# Patient Record
Sex: MALE | Race: BLACK OR AFRICAN AMERICAN
[De-identification: names, ages, dates, MRNs, and addresses within clinical notes are randomized per-mention and may not be internally consistent; named-entity substitution may affect disease eponyms.]

---

## 2017-06-12 ENCOUNTER — HOSPITAL ENCOUNTER (EMERGENCY)
Dept: HOSPITAL 23 - SED | Age: 47
Discharge: HOME | End: 2017-06-12
Payer: COMMERCIAL

## 2017-06-12 DIAGNOSIS — I25.2: ICD-10-CM

## 2017-06-12 DIAGNOSIS — E86.0: Primary | ICD-10-CM

## 2017-06-12 DIAGNOSIS — I10: ICD-10-CM

## 2017-06-12 LAB
BARBITURATES UR QL SCN: 0.3 MG/DL (ref 0.2–2)
BARBITURATES UR QL SCN: 4.4 G/DL (ref 3.5–5)
BARBITURATES: (no result)
BASOPHIL#: 0.1 X10E3 (ref 0–0.3)
BASOPHIL%: 0.8 % (ref 0–2.5)
BENZODIAZ UR QL SCN: 22 U/L (ref 10–40)
BENZODIAZ UR QL SCN: 24 U/L (ref 10–42)
BENZODIAZEPINES: (no result)
BLOOD UREA NITROGEN: 28 MG/DL (ref 9–23)
BUN/CREATININE RATIO: 16.47
BZE UR QL SCN: 82 U/L (ref 32–92)
CALCIUM SERUM: 9.5 MG/DL (ref 8.4–10.2)
CK MB SERPL-RTO: 14.2 % (ref 11–15.5)
CK MB SERPL-RTO: 33.3 G/DL (ref 30–36)
COCAINE: (no result)
CREATININE SERUM: 1.7 MG/DL (ref 0.6–1.4)
DIFF IND: NO
DX ICD CODE: (no result)
DX ICD CODE: (no result)
EOSINOPHIL#: 0.1 X10E3 (ref 0–0.7)
EOSINOPHIL%: 0.7 % (ref 0–7)
GENTAMICIN PEAK SERPL-MCNC: NO MG/L
GLOM FILT RATE ESTIMATED: 54.1 ML/MIN (ref 60–?)
GLUCOSE FASTING: 96 MG/DL (ref 70–110)
HEMATOCRIT: 42.3 % (ref 38–50)
HEMOGLOBIN: 14.1 GM/DL (ref 13–16)
KETONES UR QL: 101 MMOL/L (ref 100–111)
KETONES UR QL: 29 MMOL/L (ref 22–31)
LYMPHOCYTE#: 1.5 X10E3 (ref 1–3.5)
LYMPHOCYTE%: 16.9 % (ref 17–45)
MEAN CELL VOLUME: 83.1 FL (ref 83–96)
MEAN CORPUSCULAR HEMOGLOBIN: 27.7 PG (ref 28–34)
MEAN PLATELET VOLUME: 10.2 FL (ref 6.5–11.5)
METHADONE UR QL SCN: 2.3 NG/ML (ref 0–7.9)
MICRO INDICATED?: YES
MONOCYTE#: 0.7 X10E3 (ref 0–1)
MONOCYTE%: 7.3 % (ref 3–12)
NEUTROPHIL#: 6.6 X10E3 (ref 1.5–7.1)
NEUTROPHIL%: 74.3 % (ref 40–75)
OPIATES: (no result)
PLATELET COUNT: 262 X10E3 (ref 140–420)
POC - TROPONIN: <0.05 NG/ML (ref ?–0.05)
POTASSIUM: 3 MMOL/L (ref 3.5–5.1)
PROTEIN TOTAL SERUM: 8.1 G/DL (ref 6–8.3)
RED BLOOD COUNT: 5.09 X10E (ref 3.9–5.6)
SODIUM: 139 MMOL/L (ref 135–145)
TRICYCLIC ANTIDEPRESSANTS: (no result)
U METHADONE: (no result)
URINE APPEARANCE: CLEAR
URINE BACTERIA: (no result)
URINE BILIRUBIN: (no result)
URINE BLOOD: (no result)
URINE COLOR: YELLOW
URINE GLUCOSE: (no result) MG/DL
URINE GRANULAR CAST: (no result) /[HPF]
URINE HYALINE CAST: (no result) /[HPF]
URINE KETONE: (no result)
URINE LEUKOCYTE ESTERASE: (no result)
URINE NITRATE: (no result)
URINE PH: 6.5 (ref 5–8)
URINE PROTEIN: (no result)
URINE RBC: (no result) /[HPF] (ref 0–2)
URINE SOURCE: (no result)
URINE SPECIFIC GRAVITY: <=1.005 (ref 1–1.03)
URINE UROBILINOGEN: 0.2 MG/DL
URINE WBC: (no result) /[HPF] (ref 0–5)
WHITE BLOOD COUNT: 8.9 X10E3 (ref 4–10.5)

## 2018-05-19 ENCOUNTER — HOSPITAL ENCOUNTER (EMERGENCY)
Facility: HOSPITAL | Age: 48
Discharge: HOME | End: 2018-05-19
Attending: EMERGENCY MEDICINE | Admitting: EMERGENCY MEDICINE

## 2018-05-19 ENCOUNTER — APPOINTMENT (OUTPATIENT)
Dept: CT IMAGING | Facility: HOSPITAL | Age: 48
End: 2018-05-19

## 2018-05-19 VITALS
TEMPERATURE: 97.5 F | RESPIRATION RATE: 16 BRPM | SYSTOLIC BLOOD PRESSURE: 127 MMHG | HEIGHT: 66 IN | DIASTOLIC BLOOD PRESSURE: 81 MMHG | BODY MASS INDEX: 27 KG/M2 | HEART RATE: 74 BPM | OXYGEN SATURATION: 99 % | WEIGHT: 168 LBS

## 2018-05-19 DIAGNOSIS — R10.13 EPIGASTRIC ABDOMINAL PAIN: Primary | ICD-10-CM

## 2018-05-19 DIAGNOSIS — K52.9 GASTROENTERITIS: ICD-10-CM

## 2018-05-19 DIAGNOSIS — K20.90 ESOPHAGITIS: ICD-10-CM

## 2018-05-19 LAB
ALBUMIN SERPL-MCNC: 4 G/DL (ref 3.5–5.2)
ALBUMIN/GLOB SERPL: 1.1 G/DL
ALP SERPL-CCNC: 78 U/L (ref 39–117)
ALT SERPL W P-5'-P-CCNC: 15 U/L (ref 1–41)
ANION GAP SERPL CALCULATED.3IONS-SCNC: 14.4 MMOL/L
AST SERPL-CCNC: 17 U/L (ref 1–40)
BASOPHILS # BLD AUTO: 0.02 10*3/MM3 (ref 0–0.2)
BASOPHILS NFR BLD AUTO: 0.2 % (ref 0–1.5)
BILIRUB SERPL-MCNC: 0.2 MG/DL (ref 0.1–1.2)
BUN BLD-MCNC: 18 MG/DL (ref 6–20)
BUN/CREAT SERPL: 15.7 (ref 7–25)
CALCIUM SPEC-SCNC: 8.9 MG/DL (ref 8.6–10.5)
CHLORIDE SERPL-SCNC: 101 MMOL/L (ref 98–107)
CO2 SERPL-SCNC: 25.6 MMOL/L (ref 22–29)
CREAT BLD-MCNC: 1.15 MG/DL (ref 0.76–1.27)
DEPRECATED RDW RBC AUTO: 45.9 FL (ref 37–54)
EOSINOPHIL # BLD AUTO: 0.08 10*3/MM3 (ref 0–0.7)
EOSINOPHIL NFR BLD AUTO: 1 % (ref 0.3–6.2)
ERYTHROCYTE [DISTWIDTH] IN BLOOD BY AUTOMATED COUNT: 14.8 % (ref 11.5–14.5)
GFR SERPL CREATININE-BSD FRML MDRD: 68 ML/MIN/1.73
GLOBULIN UR ELPH-MCNC: 3.5 GM/DL
GLUCOSE BLD-MCNC: 121 MG/DL (ref 65–99)
HCT VFR BLD AUTO: 44.3 % (ref 40.4–52.2)
HGB BLD-MCNC: 14.5 G/DL (ref 13.7–17.6)
IMM GRANULOCYTES # BLD: 0.03 10*3/MM3 (ref 0–0.03)
IMM GRANULOCYTES NFR BLD: 0.4 % (ref 0–0.5)
LIPASE SERPL-CCNC: 126 U/L (ref 13–60)
LYMPHOCYTES # BLD AUTO: 1.66 10*3/MM3 (ref 0.9–4.8)
LYMPHOCYTES NFR BLD AUTO: 20.5 % (ref 19.6–45.3)
MCH RBC QN AUTO: 27.9 PG (ref 27–32.7)
MCHC RBC AUTO-ENTMCNC: 32.7 G/DL (ref 32.6–36.4)
MCV RBC AUTO: 85.4 FL (ref 79.8–96.2)
MONOCYTES # BLD AUTO: 0.63 10*3/MM3 (ref 0.2–1.2)
MONOCYTES NFR BLD AUTO: 7.8 % (ref 5–12)
NEUTROPHILS # BLD AUTO: 5.69 10*3/MM3 (ref 1.9–8.1)
NEUTROPHILS NFR BLD AUTO: 70.5 % (ref 42.7–76)
PLATELET # BLD AUTO: 262 10*3/MM3 (ref 140–500)
PMV BLD AUTO: 12.1 FL (ref 6–12)
POTASSIUM BLD-SCNC: 3.2 MMOL/L (ref 3.5–5.2)
PROT SERPL-MCNC: 7.5 G/DL (ref 6–8.5)
RBC # BLD AUTO: 5.19 10*6/MM3 (ref 4.6–6)
SODIUM BLD-SCNC: 141 MMOL/L (ref 136–145)
TROPONIN T SERPL-MCNC: <0.01 NG/ML (ref 0–0.03)
WBC NRBC COR # BLD: 8.08 10*3/MM3 (ref 4.5–10.7)

## 2018-05-19 PROCEDURE — 93005 ELECTROCARDIOGRAM TRACING: CPT | Performed by: PHYSICIAN ASSISTANT

## 2018-05-19 PROCEDURE — 25010000002 ONDANSETRON PER 1 MG: Performed by: PHYSICIAN ASSISTANT

## 2018-05-19 PROCEDURE — 96361 HYDRATE IV INFUSION ADD-ON: CPT

## 2018-05-19 PROCEDURE — 84484 ASSAY OF TROPONIN QUANT: CPT | Performed by: PHYSICIAN ASSISTANT

## 2018-05-19 PROCEDURE — 93010 ELECTROCARDIOGRAM REPORT: CPT | Performed by: INTERNAL MEDICINE

## 2018-05-19 PROCEDURE — 80053 COMPREHEN METABOLIC PANEL: CPT | Performed by: PHYSICIAN ASSISTANT

## 2018-05-19 PROCEDURE — 85025 COMPLETE CBC W/AUTO DIFF WBC: CPT | Performed by: PHYSICIAN ASSISTANT

## 2018-05-19 PROCEDURE — 99285 EMERGENCY DEPT VISIT HI MDM: CPT

## 2018-05-19 PROCEDURE — 96374 THER/PROPH/DIAG INJ IV PUSH: CPT

## 2018-05-19 PROCEDURE — 74177 CT ABD & PELVIS W/CONTRAST: CPT

## 2018-05-19 PROCEDURE — 25010000002 IOPAMIDOL 61 % SOLUTION: Performed by: EMERGENCY MEDICINE

## 2018-05-19 PROCEDURE — 83690 ASSAY OF LIPASE: CPT | Performed by: PHYSICIAN ASSISTANT

## 2018-05-19 RX ORDER — ONDANSETRON 4 MG/1
4 TABLET, FILM COATED ORAL EVERY 8 HOURS PRN
Qty: 15 TABLET | Refills: 0 | Status: SHIPPED | OUTPATIENT
Start: 2018-05-19

## 2018-05-19 RX ORDER — PANTOPRAZOLE SODIUM 40 MG/1
40 TABLET, DELAYED RELEASE ORAL DAILY
Qty: 30 TABLET | Refills: 0 | Status: SHIPPED | OUTPATIENT
Start: 2018-05-19

## 2018-05-19 RX ORDER — ONDANSETRON 2 MG/ML
4 INJECTION INTRAMUSCULAR; INTRAVENOUS ONCE
Status: COMPLETED | OUTPATIENT
Start: 2018-05-19 | End: 2018-05-19

## 2018-05-19 RX ORDER — SODIUM CHLORIDE 0.9 % (FLUSH) 0.9 %
10 SYRINGE (ML) INJECTION AS NEEDED
Status: DISCONTINUED | OUTPATIENT
Start: 2018-05-19 | End: 2018-05-19 | Stop reason: HOSPADM

## 2018-05-19 RX ORDER — ONDANSETRON 4 MG/1
4 TABLET, ORALLY DISINTEGRATING ORAL ONCE
Status: COMPLETED | OUTPATIENT
Start: 2018-05-19 | End: 2018-05-19

## 2018-05-19 RX ORDER — ACETAMINOPHEN 500 MG
1000 TABLET ORAL ONCE
Status: COMPLETED | OUTPATIENT
Start: 2018-05-19 | End: 2018-05-19

## 2018-05-19 RX ORDER — ALUMINA, MAGNESIA, AND SIMETHICONE 2400; 2400; 240 MG/30ML; MG/30ML; MG/30ML
15 SUSPENSION ORAL ONCE
Status: COMPLETED | OUTPATIENT
Start: 2018-05-19 | End: 2018-05-19

## 2018-05-19 RX ADMIN — ONDANSETRON 4 MG: 4 TABLET, ORALLY DISINTEGRATING ORAL at 13:59

## 2018-05-19 RX ADMIN — ALUMINUM HYDROXIDE, MAGNESIUM HYDROXIDE, AND DIMETHICONE 15 ML: 400; 400; 40 SUSPENSION ORAL at 12:22

## 2018-05-19 RX ADMIN — IOPAMIDOL 85 ML: 612 INJECTION, SOLUTION INTRAVENOUS at 12:06

## 2018-05-19 RX ADMIN — SODIUM CHLORIDE 1000 ML: 9 INJECTION, SOLUTION INTRAVENOUS at 10:54

## 2018-05-19 RX ADMIN — LIDOCAINE HYDROCHLORIDE 15 ML: 20 SOLUTION ORAL; TOPICAL at 12:22

## 2018-05-19 RX ADMIN — ONDANSETRON 4 MG: 2 SOLUTION INTRAMUSCULAR; INTRAVENOUS at 11:08

## 2018-05-19 RX ADMIN — ACETAMINOPHEN 1000 MG: 500 TABLET, FILM COATED ORAL at 14:34

## 2018-05-19 NOTE — ED NOTES
Pt feeling less nauseated and asking for water. Water given at this time.      Michelle Bingham RN  05/19/18 4936

## 2018-05-19 NOTE — ED NOTES
"Pt reports abd \"burning\" does not want crackers. Wants juice. Juice given.      Michelle Bingham RN  05/19/18 2775    "

## 2018-05-19 NOTE — ED NOTES
Will reEVAL pt's readiness for DC post zofran      ED provider aware     Michelle Bingham, RN  05/19/18 1400

## 2018-05-19 NOTE — ED NOTES
Pt reports now has a headache. Is walking around in [place]. Requesting tylenol. Less nausea and tolerating fluids      Michelle Bingham RN  05/19/18 7486       Michelle Bingham RN  05/19/18 2299

## 2018-05-19 NOTE — ED PROVIDER NOTES
Discussed pt's case with Harpal SPANN.  Pt presents to the ER c/o epigastric abdominal pain that began at approximately 0800 this morning. Pt also c/o mild dyspnea and N/V.     Exam:  Unremarkable exam  Pt is non-toxic appearing   Alert/ oriented x3  No apparent distress     MDM  Plan to perform CT abdomen/ pelvis for further evaluation.     MD ATTESTATION NOTE    The LILIAM and I have discussed this patient's history, physical exam, and treatment plan.  I have reviewed the documentation and personally had a face to face interaction with the patient. I affirm the documentation and agree with the treatment and plan.  The attached note describes my personal findings.    Documentation assistance provided by dayan Lockett for Dr. Watson.  Information recorded by the scribe was done at my direction and has been verified and validated by me.         Kurt Lockett  05/19/18 1221       Manuel Watson MD  05/19/18 6172

## 2018-05-19 NOTE — ED PROVIDER NOTES
" EMERGENCY DEPARTMENT ENCOUNTER    CHIEF COMPLAINT  Chief Complaint: epigastric pain with N/V  History given by: patient  History limited by: poor historian  Room Number: 12/12  PMD: No Known Provider      HPI:  Pt is a 47 y.o. male who presents complaining of epigastric pain with N/V that began today around 0800 this AM. He states that it began with him \"feeling hot\" and was also experiencing SOA and dizziness. Pt states he often gets heartburn. He denies diarrhea or being around others that are sick. Per EMS, pt refused an IV, blood sugar level check, and ASA PTA. Pt states his symptoms have improved. Pt says he had open heart surgery in 2015.    Duration:  2.5 hours  Onset: gradual  Timing: constant  Location: epigastric  Radiation: none  Quality: \"pain\", N/V  Intensity/Severity: moderate  Progression: unchanged  Associated Symptoms: none  Aggravating Factors: none  Alleviating Factors: none  Previous Episodes: Pt does not report previous episode.  Treatment before arrival: Pt reports no treatment PTA.    PAST MEDICAL HISTORY  Active Ambulatory Problems     Diagnosis Date Noted   • No Active Ambulatory Problems     Resolved Ambulatory Problems     Diagnosis Date Noted   • No Resolved Ambulatory Problems     Past Medical History:   Diagnosis Date   • Hypertension        PAST SURGICAL HISTORY  Past Surgical History:   Procedure Laterality Date   • CARDIAC SURGERY      stated heart surgery but does not know what       FAMILY HISTORY  History reviewed. No pertinent family history.    SOCIAL HISTORY  Social History     Social History   • Marital status: Single     Spouse name: N/A   • Number of children: N/A   • Years of education: N/A     Occupational History   • Not on file.     Social History Main Topics   • Smoking status: Former Smoker   • Smokeless tobacco: Not on file   • Alcohol use No   • Drug use: No   • Sexual activity: Defer     Other Topics Concern   • Not on file     Social History Narrative   • No " narrative on file       ALLERGIES  Patient has no known allergies.    REVIEW OF SYSTEMS  Review of Systems   Constitutional: Positive for fever (subjective). Negative for activity change and appetite change.   HENT: Negative for congestion and sore throat.    Respiratory: Positive for shortness of breath. Negative for cough.    Cardiovascular: Negative for chest pain and leg swelling.   Gastrointestinal: Positive for abdominal pain (epigastric), nausea and vomiting. Negative for diarrhea.   Genitourinary: Negative for decreased urine volume and dysuria.   Musculoskeletal: Negative for neck pain.   Skin: Negative for rash and wound.   Neurological: Positive for dizziness. Negative for weakness, numbness and headaches.   All other systems reviewed and are negative.      PHYSICAL EXAM  ED Triage Vitals [05/19/18 1008]   Temp Heart Rate Resp BP SpO2   97.5 °F (36.4 °C) 76 -- 150/90 99 %      Temp src Heart Rate Source Patient Position BP Location FiO2 (%)   Tympanic Monitor -- -- --       Physical Exam   Constitutional: He is oriented to person, place, and time and well-developed, well-nourished, and in no distress.   HENT:   Head: Normocephalic and atraumatic.   Eyes: EOM are normal. Pupils are equal, round, and reactive to light.   Neck: Normal range of motion. Neck supple.   Cardiovascular: Normal rate, regular rhythm and normal heart sounds.    Pulmonary/Chest: Effort normal and breath sounds normal. No respiratory distress.   Abdominal: Soft. There is tenderness (mild) in the epigastric area. There is no rebound, no guarding and no CVA tenderness.   Musculoskeletal: Normal range of motion. He exhibits no edema.   Neurological: He is alert and oriented to person, place, and time. He has normal sensation and normal strength.   Skin: Skin is warm and dry.   Psychiatric: Mood and affect normal.   Nursing note and vitals reviewed.      LAB RESULTS  Lab Results (last 24 hours)     Procedure Component Value Units  Date/Time    CBC & Differential [[id number]] Collected:  05/19/18 1101    Specimen:  Blood Updated:  05/19/18 1127    Narrative:       The following orders were created for panel order CBC & Differential.  Procedure                               Abnormality         Status                     ---------                               -----------         ------                     CBC Auto Differential[[id number]]        Abnormal            Final result                 Please view results for these tests on the individual orders.    Comprehensive Metabolic Panel [[id number]]  (Abnormal) Collected:  05/19/18 1101    Specimen:  Blood Updated:  05/19/18 1150     Glucose 121 (H) mg/dL      BUN 18 mg/dL      Creatinine 1.15 mg/dL      Sodium 141 mmol/L      Potassium 3.2 (L) mmol/L      Chloride 101 mmol/L      CO2 25.6 mmol/L      Calcium 8.9 mg/dL      Total Protein 7.5 g/dL      Albumin 4.00 g/dL      ALT (SGPT) 15 U/L      AST (SGOT) 17 U/L      Alkaline Phosphatase 78 U/L      Total Bilirubin 0.2 mg/dL      eGFR Non African Amer 68 mL/min/1.73      Globulin 3.5 gm/dL      A/G Ratio 1.1 g/dL      BUN/Creatinine Ratio 15.7     Anion Gap 14.4 mmol/L     Lipase [[id number]]  (Abnormal) Collected:  05/19/18 1101    Specimen:  Blood Updated:  05/19/18 1150     Lipase 126 (H) U/L     CBC Auto Differential [[id number]]  (Abnormal) Collected:  05/19/18 1101    Specimen:  Blood Updated:  05/19/18 1127     WBC 8.08 10*3/mm3      RBC 5.19 10*6/mm3      Hemoglobin 14.5 g/dL      Hematocrit 44.3 %      MCV 85.4 fL      MCH 27.9 pg      MCHC 32.7 g/dL      RDW 14.8 (H) %      RDW-SD 45.9 fl      MPV 12.1 (H) fL      Platelets 262 10*3/mm3      Neutrophil % 70.5 %      Lymphocyte % 20.5 %      Monocyte % 7.8 %      Eosinophil % 1.0 %      Basophil % 0.2 %      Immature Grans % 0.4 %      Neutrophils, Absolute 5.69 10*3/mm3      Lymphocytes, Absolute 1.66 10*3/mm3      Monocytes, Absolute 0.63 10*3/mm3      Eosinophils, Absolute 0.08  10*3/mm3      Basophils, Absolute 0.02 10*3/mm3      Immature Grans, Absolute 0.03 10*3/mm3     Troponin [[id number]]  (Normal) Collected:  18 1101    Specimen:  Blood Updated:  18 1150     Troponin T <0.010 ng/mL     Narrative:       Troponin T Reference Ranges:  Less than 0.03 ng/mL:    Negative for AMI  0.03 to 0.09 ng/mL:      Indeterminant for AMI  Greater than 0.09 ng/mL: Positive for AMI          I ordered the above labs and reviewed the results    RADIOLOGY  CT Abdomen Pelvis With Contrast      The CT scan was performed as an emergency procedure through the abdomen  and pelvis with intravenous contrast and demonstrates the followin. The lung bases are clear. The liver, spleen, pancreas, and both  adrenal glands appear normal. There are several small bilateral renal  cysts and no evidence of renal obstruction. The gallbladder shows no  gallstones or wall thickening.  2. There is no aortic aneurysm or retroperitoneal lymphadenopathy. The  large and small bowel loops are normal in caliber and show no  inflammatory change. The appendix appears normal. No abnormality is seen  in the pelvis.        I ordered the above noted radiological studies. Interpreted by radiologist. Reviewed by me in PACS.       PROCEDURES  Procedures  EKG           EKG time: 1055  Rhythm/Rate: NSR, 73  P waves and CA: normal  QRS, axis: normal   ST and T waves: normal     Interpreted Contemporaneously by me, independently viewed  No prior for comparison.      PROGRESS AND CONSULTS  1029  Ordered xylocaine and maalox for pain, EKG, and troponin after bedside evaluation.    1325  Rechecked pt who is resting comfortably. His pain and nausea is improved. Notified pt of all lab, imaging, and EKG results. Discussed plan to d/c home. Pt understands and agrees with the plan, all questions answered.    MEDICAL DECISION MAKING  Results were reviewed/discussed with the patient and they were also made aware of online access. Pt also  made aware that some labs, such as cultures, will not be resulted during ER visit and follow up with PMD is necessary.     MDM  Number of Diagnoses or Management Options  Epigastric abdominal pain:   Gastroenteritis:      Amount and/or Complexity of Data Reviewed  Clinical lab tests: ordered and reviewed (Lipase=126)  Tests in the radiology section of CPT®: ordered and reviewed (CT abd/pelvis is negative.)  Tests in the medicine section of CPT®: ordered and reviewed (See procedure note.)  Independent visualization of images, tracings, or specimens: yes    Patient Progress  Patient progress: stable         DIAGNOSIS  Final diagnoses:   Epigastric abdominal pain   Gastroenteritis   Esophagitis       DISPOSITION  DISCHARGE    Patient discharged in stable condition.    Reviewed implications of results, diagnosis, meds, responsibility to follow up, warning signs and symptoms of possible worsening, potential complications and reasons to return to ER.    Patient/Family voiced understanding of above instructions.    Discussed plan for discharge, as there is no emergent indication for admission. Patient referred to primary care provider for BP management due to today's BP. Pt/family is agreeable and understands need for follow up and repeat testing.  Pt is aware that discharge does not mean that nothing is wrong but it indicates no emergency is present that requires admission and they must continue care with follow-up as given below or physician of their choice.     FOLLOW-UP  Judd Salamanca MD  [address] [address]  [phone]    Schedule an appointment as soon as possible for a visit       [place] [address]  [phone]  Schedule an appointment as soon as possible for a visit   As needed         Medication List      New Prescriptions    ondansetron 4 MG tablet  Commonly known as:  ZOFRAN  Take 1 tablet by mouth Every 8 (Eight) Hours As Needed for  Nausea or   Vomiting.     pantoprazole 40 MG EC tablet  Commonly known as:  PROTONIX  Take 1 tablet by mouth Daily.          Latest Documented Vital Signs:  As of 1:40 PM  BP- 132/95 HR- 72 Temp- 97.5 °F (36.4 °C) (Tympanic) O2 sat- 97%    --  Documentation assistance provided by dayan Deng for Anam Rowan PA-C.  Information recorded by the scribe was done at my direction and has been verified and validated by me.     Kristin Deng  05/19/18 1340       JANICE Franco  05/19/18 5902

## 2018-05-24 ENCOUNTER — OFFICE VISIT (OUTPATIENT)
Dept: GASTROENTEROLOGY | Facility: CLINIC | Age: 48
End: 2018-05-24

## 2018-05-24 VITALS
WEIGHT: 174 LBS | DIASTOLIC BLOOD PRESSURE: 82 MMHG | TEMPERATURE: 97.8 F | BODY MASS INDEX: 27.31 KG/M2 | SYSTOLIC BLOOD PRESSURE: 122 MMHG | HEIGHT: 67 IN

## 2018-05-24 DIAGNOSIS — R10.13 EPIGASTRIC PAIN: ICD-10-CM

## 2018-05-24 DIAGNOSIS — K21.9 GASTROESOPHAGEAL REFLUX DISEASE, ESOPHAGITIS PRESENCE NOT SPECIFIED: Primary | ICD-10-CM

## 2018-05-24 DIAGNOSIS — Z86.19 HISTORY OF HELICOBACTER PYLORI INFECTION: ICD-10-CM

## 2018-05-24 PROCEDURE — 99203 OFFICE O/P NEW LOW 30 MIN: CPT | Performed by: INTERNAL MEDICINE

## 2018-05-24 NOTE — PROGRESS NOTES
Chief Complaint   Patient presents with   • Follow-up     Emergency department   • Abdominal Pain     Epigastric pain   • Abnormal Lab     Lipase: 126     Silvio Mueller is a 47 y.o. male who presents with a History of abdominal pain   HPI     Patient 47-year-old male with history of hypertension and coronary artery disease status post open heart in 2015 presenting with epigastric pain improved with pantoprazole.  Patient reports 2008 had similar pains found with a gastric ulcer on endoscopy you have [facility].  Patient reports treated with a pills a day consistent with H. pylori therapy.  Patient returns now with similar pains concerned for the possibility of stomach cancer.  [place] this is a common occurrence with ulcers turning into cancer and patient would like endoscopic evaluation.  Patient denies weight loss no vomiting or melena positive reflux though with heartburn here for further recommendations.    Past Medical History:   Diagnosis Date   • GERD (gastroesophageal reflux disease)    • Hypertension        Current Outpatient Prescriptions:   •  ondansetron (ZOFRAN) 4 MG tablet, Take 1 tablet by mouth Every 8 (Eight) Hours As Needed for Nausea or Vomiting., Disp: 15 tablet, Rfl: 0  •  pantoprazole (PROTONIX) 40 MG EC tablet, Take 1 tablet by mouth Daily., Disp: 30 tablet, Rfl: 0  No Known Allergies  Social History     Social History   • Marital status: Single     Spouse name: N/A   • Number of children: N/A   • Years of education: N/A     Occupational History   • Not on file.     Social History Main Topics   • Smoking status: Former Smoker   • Smokeless tobacco: Not on file   • Alcohol use No   • Drug use: No   • Sexual activity: Defer     Other Topics Concern   • Not on file     Social History Narrative   • No narrative on file     Family History   Problem Relation Age of Onset   • Liver cancer Father      Review of Systems   Constitutional: Negative.    HENT: Negative.     Eyes: Negative.    Respiratory: Negative.    Cardiovascular: Negative.    Gastrointestinal: Negative.    Endocrine: Negative.    Skin: Negative.    Allergic/Immunologic: Negative.    Hematological: Negative.      Vitals:    05/24/18 1529   BP: 122/82   Temp: 97.8 °F (36.6 °C)     Physical Exam   Constitutional: He is oriented to person, place, and time. He appears well-developed and well-nourished.   HENT:   Head: Normocephalic and atraumatic.   Eyes: Conjunctivae and EOM are normal. Pupils are equal, round, and reactive to light. No scleral icterus.   Neck: Normal range of motion.   Cardiovascular: Normal rate and regular rhythm.  Exam reveals no gallop and no friction rub.    No murmur heard.  Pulmonary/Chest: Effort normal and breath sounds normal. No respiratory distress. He has no wheezes. He has no rales.   Abdominal: Soft. Bowel sounds are normal. He exhibits no distension and no mass. There is no tenderness. There is no rebound and no guarding.   Musculoskeletal: Normal range of motion. He exhibits no edema.   Lymphadenopathy:     He has no cervical adenopathy.   Neurological: He is alert and oriented to person, place, and time. No cranial nerve deficit.   Skin: Skin is warm and dry. No rash noted.   Psychiatric: He has a normal mood and affect. His behavior is normal.   Nursing note and vitals reviewed.    Diagnoses and all orders for this visit:    Gastroesophageal reflux disease, esophagitis presence not specified  -     Case Request; Standing  -     Case Request    Epigastric pain  -     Case Request; Standing  -     Case Request    History of Helicobacter pylori infection  -     Case Request; Standing  -     Case Request    Other orders  -     Implement Anesthesia orders day of procedure.; Standing  -     Obtain informed consent; Standing    Patient is a 47-year-old male with history coronary artery disease status post open heart surgery presenting with acute epigastric pain and GERD.  Patient reports  doing better since starting on pantoprazole but is concerned with history of ulcers.  [place] often ulcers and up turning into cancer patient's concern.  Patient reports in 2008 presenting to you [facility] with similar epigastric pain found with an ulcer but given a different pills a day in order to treat this.  This consistent with likely H. pylori infection.  Patient does not recall whether he was retested to confirm eradication.  Patient now with similar pains worried that the ulcers back.  For now we'll continue pantoprazole and arrange EGD to confirm eradication and no abnormalities of the gastric wall remaining.

## 2018-05-25 ENCOUNTER — ANESTHESIA (OUTPATIENT)
Dept: GASTROENTEROLOGY | Facility: HOSPITAL | Age: 48
End: 2018-05-25

## 2018-05-25 ENCOUNTER — ANESTHESIA EVENT (OUTPATIENT)
Dept: GASTROENTEROLOGY | Facility: HOSPITAL | Age: 48
End: 2018-05-25

## 2018-05-25 ENCOUNTER — HOSPITAL ENCOUNTER (OUTPATIENT)
Facility: HOSPITAL | Age: 48
Setting detail: HOSPITAL OUTPATIENT SURGERY
Discharge: HOME | End: 2018-05-25
Attending: INTERNAL MEDICINE | Admitting: INTERNAL MEDICINE

## 2018-05-25 VITALS
TEMPERATURE: 98.1 F | SYSTOLIC BLOOD PRESSURE: 134 MMHG | HEIGHT: 66 IN | HEART RATE: 71 BPM | BODY MASS INDEX: 27.99 KG/M2 | WEIGHT: 174.19 LBS | OXYGEN SATURATION: 98 % | DIASTOLIC BLOOD PRESSURE: 109 MMHG | RESPIRATION RATE: 16 BRPM

## 2018-05-25 DIAGNOSIS — Z86.19 HISTORY OF HELICOBACTER PYLORI INFECTION: ICD-10-CM

## 2018-05-25 DIAGNOSIS — K21.9 GASTROESOPHAGEAL REFLUX DISEASE, ESOPHAGITIS PRESENCE NOT SPECIFIED: ICD-10-CM

## 2018-05-25 DIAGNOSIS — R10.13 EPIGASTRIC PAIN: ICD-10-CM

## 2018-05-25 PROCEDURE — 43239 EGD BIOPSY SINGLE/MULTIPLE: CPT | Performed by: INTERNAL MEDICINE

## 2018-05-25 PROCEDURE — 25010000002 PROPOFOL 10 MG/ML EMULSION: Performed by: ANESTHESIOLOGY

## 2018-05-25 PROCEDURE — 88305 TISSUE EXAM BY PATHOLOGIST: CPT | Performed by: INTERNAL MEDICINE

## 2018-05-25 PROCEDURE — 88312 SPECIAL STAINS GROUP 1: CPT | Performed by: INTERNAL MEDICINE

## 2018-05-25 RX ORDER — PROPOFOL 10 MG/ML
VIAL (ML) INTRAVENOUS CONTINUOUS PRN
Status: DISCONTINUED | OUTPATIENT
Start: 2018-05-25 | End: 2018-05-25 | Stop reason: SURG

## 2018-05-25 RX ORDER — LIDOCAINE HYDROCHLORIDE 20 MG/ML
INJECTION, SOLUTION INFILTRATION; PERINEURAL AS NEEDED
Status: DISCONTINUED | OUTPATIENT
Start: 2018-05-25 | End: 2018-05-25 | Stop reason: SURG

## 2018-05-25 RX ORDER — PANTOPRAZOLE SODIUM 40 MG/1
40 TABLET, DELAYED RELEASE ORAL DAILY
Qty: 90 TABLET | Refills: 3 | Status: SHIPPED | OUTPATIENT
Start: 2018-05-25

## 2018-05-25 RX ORDER — PROPOFOL 10 MG/ML
VIAL (ML) INTRAVENOUS AS NEEDED
Status: DISCONTINUED | OUTPATIENT
Start: 2018-05-25 | End: 2018-05-25 | Stop reason: SURG

## 2018-05-25 RX ORDER — SODIUM CHLORIDE, SODIUM LACTATE, POTASSIUM CHLORIDE, CALCIUM CHLORIDE 600; 310; 30; 20 MG/100ML; MG/100ML; MG/100ML; MG/100ML
30 INJECTION, SOLUTION INTRAVENOUS CONTINUOUS PRN
Status: DISCONTINUED | OUTPATIENT
Start: 2018-05-25 | End: 2018-05-25 | Stop reason: HOSPADM

## 2018-05-25 RX ORDER — SODIUM CHLORIDE 0.9 % (FLUSH) 0.9 %
1-10 SYRINGE (ML) INJECTION AS NEEDED
Status: DISCONTINUED | OUTPATIENT
Start: 2018-05-25 | End: 2018-05-25 | Stop reason: HOSPADM

## 2018-05-25 RX ORDER — SODIUM CHLORIDE, SODIUM LACTATE, POTASSIUM CHLORIDE, CALCIUM CHLORIDE 600; 310; 30; 20 MG/100ML; MG/100ML; MG/100ML; MG/100ML
INJECTION, SOLUTION INTRAVENOUS CONTINUOUS PRN
Status: DISCONTINUED | OUTPATIENT
Start: 2018-05-25 | End: 2018-05-25 | Stop reason: SURG

## 2018-05-25 RX ADMIN — PROPOFOL 180 MCG/KG/MIN: 10 INJECTION, EMULSION INTRAVENOUS at 10:19

## 2018-05-25 RX ADMIN — SODIUM CHLORIDE, POTASSIUM CHLORIDE, SODIUM LACTATE AND CALCIUM CHLORIDE 30 ML/HR: 600; 310; 30; 20 INJECTION, SOLUTION INTRAVENOUS at 09:26

## 2018-05-25 RX ADMIN — LIDOCAINE HYDROCHLORIDE 60 MG: 20 INJECTION, SOLUTION INFILTRATION; PERINEURAL at 10:19

## 2018-05-25 RX ADMIN — SODIUM CHLORIDE, POTASSIUM CHLORIDE, SODIUM LACTATE AND CALCIUM CHLORIDE: 600; 310; 30; 20 INJECTION, SOLUTION INTRAVENOUS at 10:19

## 2018-05-25 RX ADMIN — PROPOFOL 100 MG: 10 INJECTION, EMULSION INTRAVENOUS at 10:19

## 2018-05-25 NOTE — H&P (VIEW-ONLY)
Chief Complaint   Patient presents with   • Follow-up     Emergency department   • Abdominal Pain     Epigastric pain   • Abnormal Lab     Lipase: 126     Silvio Mueller is a 47 y.o. male who presents with a History of abdominal pain   HPI     Patient 47-year-old male with history of hypertension and coronary artery disease status post open heart in 2015 presenting with epigastric pain improved with pantoprazole.  Patient reports 2008 had similar pains found with a gastric ulcer on endoscopy you have [facility].  Patient reports treated with a pills a day consistent with H. pylori therapy.  Patient returns now with similar pains concerned for the possibility of stomach cancer.  Patient [place] this is a common occurrence with ulcers turning into cancer and patient would like endoscopic evaluation.  Patient denies weight loss no vomiting or melena positive reflux though with heartburn here for further recommendations.    Past Medical History:   Diagnosis Date   • GERD (gastroesophageal reflux disease)    • Hypertension        Current Outpatient Prescriptions:   •  ondansetron (ZOFRAN) 4 MG tablet, Take 1 tablet by mouth Every 8 (Eight) Hours As Needed for Nausea or Vomiting., Disp: 15 tablet, Rfl: 0  •  pantoprazole (PROTONIX) 40 MG EC tablet, Take 1 tablet by mouth Daily., Disp: 30 tablet, Rfl: 0  No Known Allergies  Social History     Social History   • Marital status: Single     Spouse name: N/A   • Number of children: N/A   • Years of education: N/A     Occupational History   • Not on file.     Social History Main Topics   • Smoking status: Former Smoker   • Smokeless tobacco: Not on file   • Alcohol use No   • Drug use: No   • Sexual activity: Defer     Other Topics Concern   • Not on file     Social History Narrative   • No narrative on file     Family History   Problem Relation Age of Onset   • Liver cancer Father      Review of Systems   Constitutional: Negative.    HENT: Negative.     Eyes: Negative.    Respiratory: Negative.    Cardiovascular: Negative.    Gastrointestinal: Negative.    Endocrine: Negative.    Skin: Negative.    Allergic/Immunologic: Negative.    Hematological: Negative.      Vitals:    05/24/18 1529   BP: 122/82   Temp: 97.8 °F (36.6 °C)     Physical Exam   Constitutional: He is oriented to person, place, and time. He appears well-developed and well-nourished.   HENT:   Head: Normocephalic and atraumatic.   Eyes: Conjunctivae and EOM are normal. Pupils are equal, round, and reactive to light. No scleral icterus.   Neck: Normal range of motion.   Cardiovascular: Normal rate and regular rhythm.  Exam reveals no gallop and no friction rub.    No murmur heard.  Pulmonary/Chest: Effort normal and breath sounds normal. No respiratory distress. He has no wheezes. He has no rales.   Abdominal: Soft. Bowel sounds are normal. He exhibits no distension and no mass. There is no tenderness. There is no rebound and no guarding.   Musculoskeletal: Normal range of motion. He exhibits no edema.   Lymphadenopathy:     He has no cervical adenopathy.   Neurological: He is alert and oriented to person, place, and time. No cranial nerve deficit.   Skin: Skin is warm and dry. No rash noted.   Psychiatric: He has a normal mood and affect. His behavior is normal.   Nursing note and vitals reviewed.    Diagnoses and all orders for this visit:    Gastroesophageal reflux disease, esophagitis presence not specified  -     Case Request; Standing  -     Case Request    Epigastric pain  -     Case Request; Standing  -     Case Request    History of Helicobacter pylori infection  -     Case Request; Standing  -     Case Request    Other orders  -     Implement Anesthesia orders day of procedure.; Standing  -     Obtain informed consent; Standing    Patient is a 47-year-old male with history coronary artery disease status post open heart surgery presenting with acute epigastric pain and GERD.  Patient reports  doing better since starting on pantoprazole but is concerned with history of ulcers.  [place] often ulcers and up turning into cancer patient's concern.  Patient reports in 2008 presenting to you [facility] with similar epigastric pain found with an ulcer but given a different pills a day in order to treat this.  This consistent with likely H. pylori infection.  Patient does not recall whether he was retested to confirm eradication.  Patient now with similar pains worried that the ulcers back.  For now we'll continue pantoprazole and arrange EGD to confirm eradication and no abnormalities of the gastric wall remaining.

## 2018-05-25 NOTE — NURSING NOTE
Multiple attempts to reach patients ride home unsuccessful.  Pt attempting to call alternate ride home now.

## 2018-05-25 NOTE — ANESTHESIA POSTPROCEDURE EVALUATION
"Patient: Silvio Mueller    Procedure Summary     Date:  05/25/18 Room / Location:   [facility]    Anesthesia Start:  1018 Anesthesia Stop:  1038    Procedure:  ESOPHAGOGASTRODUODENOSCOPY with Bx's (N/A Esophagus) Diagnosis:       Gastritis      Hiatal hernia      GERD with esophagitis      (Epigastric pain [R10.13])      (History of Helicobacter pylori infection [Z86.19])      (Gastroesophageal reflux disease, esophagitis presence not specified [K21.9])    Surgeon:  Shawn Chau MD Provider:  Justin Guzman MD    Anesthesia Type:  MAC ASA Status:  1          Anesthesia Type: MAC  Last vitals  BP   150/92 (05/25/18 0918)   Temp   36.7 °C (98.1 °F) (05/25/18 0918)   Pulse   67 (05/25/18 0918)   Resp   16 (05/25/18 0918)     SpO2   99 % (05/25/18 0918)     Post Anesthesia Care and Evaluation    Patient location during evaluation: bedside  Patient participation: complete - patient participated  Level of consciousness: awake and alert  Pain management: adequate  Airway patency: patent  Anesthetic complications: No anesthetic complications    Cardiovascular status: acceptable  Respiratory status: acceptable  Hydration status: acceptable    Comments: /92 (BP Location: Left arm, Patient Position: Lying)   Pulse 67   Temp 36.7 °C (98.1 °F) (Oral)   Resp 16   Ht 167.6 cm (66\")   Wt 79 kg (174 lb 3 oz)   SpO2 99%   BMI 28.11 kg/m²       "

## 2018-05-25 NOTE — BRIEF OP NOTE
ESOPHAGOGASTRODUODENOSCOPY  Progress Note    Silvio Mueller  5/25/2018    Pre-op Diagnosis:   Epigastric pain [R10.13]  History of Helicobacter pylori infection [Z86.19]  Gastroesophageal reflux disease, esophagitis presence not specified [K21.9]       Post-Op Diagnosis Codes:     * Gastritis [K29.70]     * Hiatal hernia [K44.9]     * GERD with esophagitis [K21.0]    Procedure/CPT® Codes:      Procedure(s):  ESOPHAGOGASTRODUODENOSCOPY with Bx's    Surgeon(s):  Shawn Chau MD    Anesthesia: Monitor Anesthesia Care    Staff:   Endo Technician: Daisy Alonso  Endo Nurse: Love Causey RN    Estimated Blood Loss: minimal    Urine Voided: * No values recorded between 5/25/2018 10:19 AM and 5/25/2018 10:37 AM *    Specimens:                ID Type Source Tests Collected by Time   A : Antrum Bx's Tissue Stomach TISSUE PATHOLOGY EXAM Shawn Chau MD 5/25/2018 1035         Drains:      Findings: Reflux esophagitis, small sliding hiatal hernia noted.  Antral gastritis seen as well biopsies taken for H. pylori    Complications: None      Shawn Chau MD     Date: 5/25/2018  Time: 10:37 AM

## 2018-05-25 NOTE — ANESTHESIA PREPROCEDURE EVALUATION
Anesthesia Evaluation     Patient summary reviewed and Nursing notes reviewed   NPO Solid Status: > 8 hours  NPO Liquid Status: < 2 hours           Airway   Mallampati: II  TM distance: >3 FB  Neck ROM: full  no difficulty expected  Dental - normal exam     Pulmonary - negative pulmonary ROS and normal exam    breath sounds clear to auscultation  (-) decreased breath sounds, wheezes  Cardiovascular - normal exam  Exercise tolerance: good (4-7 METS)    Rhythm: regular  Rate: normal    (-) hypertension      Neuro/Psych- negative ROS  (-) seizures, CVA  GI/Hepatic/Renal/Endo    (+)  GERD,    (-) diabetes    Musculoskeletal (-) negative ROS    Abdominal  - normal exam   Substance History - negative use  (-) alcohol use, drug use     OB/GYN negative ob/gyn ROS         Other - negative ROS                       Anesthesia Plan    ASA 1     MAC     intravenous induction   Anesthetic plan and risks discussed with patient.    Plan discussed with CRNA.

## 2018-05-29 LAB
CYTO UR: NORMAL
LAB AP CASE REPORT: NORMAL
Lab: NORMAL
PATH REPORT.FINAL DX SPEC: NORMAL
PATH REPORT.GROSS SPEC: NORMAL

## 2018-07-11 ENCOUNTER — TELEPHONE (OUTPATIENT)
Dept: GASTROENTEROLOGY | Facility: CLINIC | Age: 48
End: 2018-07-11

## 2018-07-11 NOTE — TELEPHONE ENCOUNTER
----- Message from Shawn Chau MD sent at 7/8/2018 12:20 PM EDT -----  Pathology benign, if not improved change ppi

## (undated) DEVICE — CANN NASL CO2 TRULINK W/O2 A/

## (undated) DEVICE — FRCP BX RADJAW4 NDL 2.8 240CM LG OG BX40

## (undated) DEVICE — BITEBLOCK OMNI BLOC

## (undated) DEVICE — TUBING, SUCTION, 1/4" X 10', STRAIGHT: Brand: MEDLINE

## (undated) DEVICE — TBG 02 CRUSH RESIST LF CLR 7FT

## (undated) DEVICE — Device: Brand: DEFENDO AIR/WATER/SUCTION AND BIOPSY VALVE